# Patient Record
Sex: FEMALE | Race: WHITE | NOT HISPANIC OR LATINO | Employment: UNEMPLOYED | URBAN - METROPOLITAN AREA
[De-identification: names, ages, dates, MRNs, and addresses within clinical notes are randomized per-mention and may not be internally consistent; named-entity substitution may affect disease eponyms.]

---

## 2024-02-28 ENCOUNTER — TELEPHONE (OUTPATIENT)
Dept: CARDIOLOGY | Facility: CLINIC | Age: 54
End: 2024-02-28

## 2024-02-28 ENCOUNTER — APPOINTMENT (OUTPATIENT)
Dept: CARDIOLOGY | Facility: CLINIC | Age: 54
End: 2024-02-28

## 2024-02-28 ENCOUNTER — HOSPITAL ENCOUNTER (OUTPATIENT)
Dept: CARDIOLOGY | Facility: CLINIC | Age: 54
Discharge: HOME | End: 2024-02-28

## 2024-02-28 ENCOUNTER — OFFICE VISIT (OUTPATIENT)
Dept: CARDIOLOGY | Facility: CLINIC | Age: 54
End: 2024-02-28

## 2024-02-28 VITALS
HEART RATE: 80 BPM | WEIGHT: 159 LBS | HEIGHT: 62 IN | BODY MASS INDEX: 29.26 KG/M2 | DIASTOLIC BLOOD PRESSURE: 80 MMHG | SYSTOLIC BLOOD PRESSURE: 127 MMHG

## 2024-02-28 DIAGNOSIS — I10 ESSENTIAL HYPERTENSION: Primary | ICD-10-CM

## 2024-02-28 DIAGNOSIS — R94.31 ABNORMAL EKG: ICD-10-CM

## 2024-02-28 DIAGNOSIS — I44.7 LEFT BUNDLE-BRANCH BLOCK, UNSPECIFIED: ICD-10-CM

## 2024-02-28 DIAGNOSIS — Z01.810 PREOPERATIVE CARDIOVASCULAR EXAMINATION: ICD-10-CM

## 2024-02-28 DIAGNOSIS — I44.7 LBBB (LEFT BUNDLE BRANCH BLOCK): ICD-10-CM

## 2024-02-28 LAB
AORTIC VALVE PEAK VELOCITY: 1.54 M/S
AV PEAK GRADIENT: 9.5 MMHG
EJECTION FRACTION APICAL 4 CHAMBER: 68
EJECTION FRACTION: 64 %
GLOBAL LONGITUDINAL STRAIN: -18.8 %
LEFT ATRIUM VOLUME AREA LENGTH INDEX BSA: 23.6 ML/M2
LEFT VENTRICLE INTERNAL DIMENSION DIASTOLE: 3.26 CM (ref 3.5–6)
LEFT VENTRICULAR OUTFLOW TRACT DIAMETER: 1.99 CM
MITRAL VALVE E/A RATIO: 1.18
MITRAL VALVE E/E' RATIO: 9.84
RIGHT VENTRICLE FREE WALL PEAK S': 11 CM/S
RIGHT VENTRICLE PEAK SYSTOLIC PRESSURE: 25.5 MMHG
TRICUSPID ANNULAR PLANE SYSTOLIC EXCURSION: 2.2 CM

## 2024-02-28 PROCEDURE — 99213 OFFICE O/P EST LOW 20 MIN: CPT | Performed by: INTERNAL MEDICINE

## 2024-02-28 PROCEDURE — 3079F DIAST BP 80-89 MM HG: CPT | Performed by: INTERNAL MEDICINE

## 2024-02-28 PROCEDURE — 93306 TTE W/DOPPLER COMPLETE: CPT | Performed by: INTERNAL MEDICINE

## 2024-02-28 PROCEDURE — 3074F SYST BP LT 130 MM HG: CPT | Performed by: INTERNAL MEDICINE

## 2024-02-28 PROCEDURE — 1036F TOBACCO NON-USER: CPT | Performed by: INTERNAL MEDICINE

## 2024-02-28 PROCEDURE — 99203 OFFICE O/P NEW LOW 30 MIN: CPT | Performed by: INTERNAL MEDICINE

## 2024-02-28 PROCEDURE — 93306 TTE W/DOPPLER COMPLETE: CPT

## 2024-02-28 RX ORDER — LISINOPRIL AND HYDROCHLOROTHIAZIDE 12.5; 2 MG/1; MG/1
1 TABLET ORAL DAILY
COMMUNITY
Start: 2023-08-05

## 2024-02-28 NOTE — PROGRESS NOTES
Chief Complaint:   No chief complaint on file.     History of Present Illness     Monica Slaughter is a 53 y.o. female presenting with pre-operative cardiac evaluation before planned partial knee replacement Dr. Hubbard under [place].  The patient has no prior history of coronary artery disease, myocardial infarction, PCI (stent), CABG, heart failure, high-grade arrhythmias, valvular heart disease, pulmonary hypertension, peripheral arterial disease, or cerebrovascular disease.  The patient has no personal or family history of anesthetic complications during prior general anesthesia.  The patient has no history of DVT or PE.  Based upon the patient's present history, while ambulation is limited due to joint pain, their functional capacity is greater than 4 METS (walks 1/2 mile).  Patients’ ability to expend >=4 METs can be assessed by estimates from activities of daily living; activities that expend >=4 METS include the ability to climb up a flight of stairs, walk up a hill, walk at ground level at 4 miles per hour, or perform heavy work around the house.  The patient denies chest pain or dyspnea on exertion.  The patient has no history of obstructive sleep apnea, alcohol abuse use, or tobacco use.  Pre-op laboratory studies were unremarkable. ECG LBBB.       Previous History     Past Medical History:  She has a past medical history of Essential hypertension (02/28/2024), LBBB (left bundle branch block) (02/28/2024), and Preoperative cardiovascular examination (02/28/2024).    Past Surgical History:  She has no past surgical history on file.      Social History:  She reports that she has never smoked. She has never used smokeless tobacco. No history on file for alcohol use and drug use.    Family History:  No family history on file.     Allergies:  Patient has no known allergies.    Outpatient Medications:  Current Outpatient Medications   Medication Instructions    lisinopriL-hydrochlorothiazide 20-12.5 mg tablet 1 tablet,  "oral, Daily       Physical Examination   Vitals:  Visit Vitals  /80 (BP Location: Right arm)   Pulse 80   Ht 1.575 m (5' 2\")   Wt 72.1 kg (159 lb)   BMI 29.08 kg/m²   Smoking Status Never   BSA 1.78 m²      Physical Exam  Vitals reviewed.   Constitutional:       General: She is not in acute distress.     Appearance: Normal appearance.   HENT:      Head: Normocephalic and atraumatic.      Nose: Nose normal.   Eyes:      Conjunctiva/sclera: Conjunctivae normal.   Cardiovascular:      Rate and Rhythm: Normal rate and regular rhythm.      Pulses: Normal pulses.      Heart sounds: No murmur heard.  Pulmonary:      Effort: Pulmonary effort is normal. No respiratory distress.      Breath sounds: Normal breath sounds. No wheezing, rhonchi or rales.   Abdominal:      General: Bowel sounds are normal. There is no distension.      Palpations: Abdomen is soft.      Tenderness: There is no abdominal tenderness.   Musculoskeletal:         General: No swelling.      Right lower leg: No edema.      Left lower leg: No edema.   Skin:     General: Skin is warm and dry.      Capillary Refill: Capillary refill takes less than 2 seconds.   Neurological:      General: No focal deficit present.      Mental Status: She is alert.   Psychiatric:         Mood and Affect: Mood normal.            Labs/Imaging/Cardiac Studies     Last Labs:  CBC -  No results found for: \"WBC\", \"HGB\", \"HCT\", \"MCV\", \"PLT\"    CMP -  No results found for: \"CALCIUM\", \"PHOS\", \"PROT\", \"ALBUMIN\", \"AST\", \"ALT\", \"ALKPHOS\", \"BILITOT\"    LIPID PANEL -   No results found for: \"CHOL\", \"HDL\", \"CHHDL\", \"LDL\", \"VLDL\", \"TRIG\", \"NHDL\"    RENAL FUNCTION PANEL -   No results found for: \"GLU\", \"K\", \"CHLOR\", \"PHOS\"    No results found for: \"BNP\", \"HGBA1C\"    ECG:    Echo:  No echocardiogram results found for the past 12 months       Assessment and Recommendations     Assessment/Plan     1. Essential hypertension  Well controlled.    2. Preoperative cardiovascular " examination  Pre-operative cardiac evaluation: Based upon the above evaluation and The 2014 American College of Cardiology/American Heart Association (ACC/AHA) Guideline on Perioperative Cardiovascular Evaluation and Management for noncardiac surgery, and calculation of the Revised Cardiac Risk Index (RCRI) score, the patient is at low risk (less than 1%) for an adverse cardiovascular event (ie, myocardial ischemia, myocardial infarction [MI], heart failure, arrhythmia, stroke, or cardiac death) for planned intermediate risk surgery.  No further cardiac testing is advised.      3. LBBB (left bundle branch block)  ASX with normal Echo. No structural HD.       Kaushik Esparza MD    Exclusive of any other services or procedures performed, I, Kaushik Esparza MD , spent 30 minutes in duration for this visit today.  This time consisted of chart review, obtaining history, and/or performing the exam as documented above as well as documenting the clinical information for the encounter in the electronic record, discussing treatment options, plans, and/or goals with patient, family, and/or caregiver, refilling medications, updating the electronic record, ordering medicines, lab work, imaging, referrals, and/or procedures as documented above and communicating with other [facility] professionals. I have discussed the results of laboratory, radiology, and cardiology studies with the patient and their family/caregiver.